# Patient Record
Sex: FEMALE | Race: ASIAN | NOT HISPANIC OR LATINO | Employment: UNEMPLOYED | URBAN - METROPOLITAN AREA
[De-identification: names, ages, dates, MRNs, and addresses within clinical notes are randomized per-mention and may not be internally consistent; named-entity substitution may affect disease eponyms.]

---

## 2019-07-29 ENCOUNTER — TRANSFERRED RECORDS (OUTPATIENT)
Dept: HEALTH INFORMATION MANAGEMENT | Facility: CLINIC | Age: 34
End: 2019-07-29

## 2019-07-29 LAB — TSH SERPL-ACNC: 2.56 MICIU/ML (ref 0.34–4.25)

## 2019-09-18 ENCOUNTER — TRANSFERRED RECORDS (OUTPATIENT)
Dept: HEALTH INFORMATION MANAGEMENT | Facility: CLINIC | Age: 34
End: 2019-09-18

## 2019-09-18 LAB — EJECTION FRACTION: 64 %

## 2019-09-25 ENCOUNTER — TRANSFERRED RECORDS (OUTPATIENT)
Dept: HEALTH INFORMATION MANAGEMENT | Facility: CLINIC | Age: 34
End: 2019-09-25

## 2019-11-01 ENCOUNTER — TRANSFERRED RECORDS (OUTPATIENT)
Dept: HEALTH INFORMATION MANAGEMENT | Facility: CLINIC | Age: 34
End: 2019-11-01

## 2019-11-01 LAB — PAP SMEAR - HIM PATIENT REPORTED: NEGATIVE

## 2020-08-05 NOTE — PROGRESS NOTES
"Subjective Pallavi Richa is a 34 year old female who presents to clinic today for the following health issues:    HPI       Hypertension Follow-up      Do you check your blood pressure regularly outside of the clinic? Yes     Are you following a low salt diet? No    Are your blood pressures ever more than 140 on the top number (systolic) OR more   than 90 on the bottom number (diastolic), for example 140/90? Yes      How many servings of fruits and vegetables do you eat daily?  2-3    On average, how many sweetened beverages do you drink each day (Examples: soda, juice, sweet tea, etc.  Do NOT count diet or artificially sweetened beverages)?   0    How many days per week do you exercise enough to make your heart beat faster? 7    How many minutes a day do you exercise enough to make your heart beat faster? 30 - 60  How many days per week do you miss taking your medication? 7    What makes it hard for you to take your medications?  Returned from [facility] about 6 months ago and has just recently run out of medication, about 15-20 days      6 weeks 3 days pregnant  Has been off medication for several weeks- stopped days after finding out she is pregnant.  Has records from [facility] including normal Renal doppler, normal Echo, labs indicating mild anemia from diagnosis 1 year ago. Has been treated with Telmisartan 40 mg, Amlodipine 5 mg.   Since stopping meds, systolic BPs are 120-130's and diastolic upper 80's-low 90's at home, but unsure if cuff properly calibrated. No headache, blurred vision, etc.       Reviewed and updated as needed this visit by Provider         Review of Systems   Constitutional, HEENT, cardiovascular, pulmonary, gi and gu systems are negative, except as otherwise noted.      Objective    /86 (BP Location: Right arm, Patient Position: Chair, Cuff Size: Adult Regular)   Pulse 102   Temp 97.5  F (36.4  C) (Oral)   Ht 1.594 m (5' 2.75\")   Wt 66.7 kg (147 lb)   SpO2 100%   BMI 26.25 kg/m  "   Body mass index is 26.25 kg/m .  Physical Exam   GENERAL: healthy, alert and no distress  PSYCH: mentation appears normal, affect normal/bright    Diagnostic Test Results:  Labs reviewed in Epic  Results for orders placed or performed in visit on 08/06/20   Basic metabolic panel     Status: Abnormal   Result Value Ref Range    Sodium 137 133 - 144 mmol/L    Potassium 3.6 3.4 - 5.3 mmol/L    Chloride 106 94 - 109 mmol/L    Carbon Dioxide 26 20 - 32 mmol/L    Anion Gap 5 3 - 14 mmol/L    Glucose 91 70 - 99 mg/dL    Urea Nitrogen 7 7 - 30 mg/dL    Creatinine 0.46 (L) 0.52 - 1.04 mg/dL    GFR Estimate >90 >60 mL/min/[1.73_m2]    GFR Estimate If Black >90 >60 mL/min/[1.73_m2]    Calcium 8.8 8.5 - 10.1 mg/dL   CBC with platelets and differential     Status: Abnormal   Result Value Ref Range    WBC 7.5 4.0 - 11.0 10e9/L    RBC Count 4.07 3.8 - 5.2 10e12/L    Hemoglobin 11.3 (L) 11.7 - 15.7 g/dL    Hematocrit 34.2 (L) 35.0 - 47.0 %    MCV 84 78 - 100 fl    MCH 27.8 26.5 - 33.0 pg    MCHC 33.0 31.5 - 36.5 g/dL    RDW 14.5 10.0 - 15.0 %    Platelet Count 375 150 - 450 10e9/L    % Neutrophils 65.2 %    % Lymphocytes 26.5 %    % Monocytes 7.7 %    % Eosinophils 0.3 %    % Basophils 0.3 %    Absolute Neutrophil 4.9 1.6 - 8.3 10e9/L    Absolute Lymphocytes 2.0 0.8 - 5.3 10e9/L    Absolute Monocytes 0.6 0.0 - 1.3 10e9/L    Absolute Eosinophils 0.0 0.0 - 0.7 10e9/L    Absolute Basophils 0.0 0.0 - 0.2 10e9/L    Diff Method Automated Method            Assessment & Plan     1. Essential hypertension  BP here today is normal and off meds for several weeks. Recommend continued surveillance of BP- will return in 2 weeks for BP check with the nurse and patient to bring her cuff for comparison. If diastolic persistently elevated, will start Labetalol due to pregnancy.   - Basic metabolic panel  - CBC with platelets and differential    2. Pregnancy, incidental  As above. Imperative to avoid ACE/ARB and patient informed of this.  Fortunately, she did stop her medication within days of learning she was pregnant.         See Patient Instructions    Return in about 2 weeks (around 8/20/2020) for Nurse only BP check.    MICKEY Burgos [facility]

## 2020-08-06 ENCOUNTER — OFFICE VISIT (OUTPATIENT)
Dept: FAMILY MEDICINE | Facility: CLINIC | Age: 35
End: 2020-08-06
Payer: COMMERCIAL

## 2020-08-06 VITALS
SYSTOLIC BLOOD PRESSURE: 124 MMHG | DIASTOLIC BLOOD PRESSURE: 86 MMHG | BODY MASS INDEX: 26.05 KG/M2 | TEMPERATURE: 97.5 F | WEIGHT: 147 LBS | OXYGEN SATURATION: 100 % | HEIGHT: 63 IN | HEART RATE: 102 BPM

## 2020-08-06 DIAGNOSIS — I10 ESSENTIAL HYPERTENSION: Primary | ICD-10-CM

## 2020-08-06 DIAGNOSIS — Z33.1 PREGNANCY, INCIDENTAL: ICD-10-CM

## 2020-08-06 LAB
BASOPHILS # BLD AUTO: 0 10E9/L (ref 0–0.2)
BASOPHILS NFR BLD AUTO: 0.3 %
DIFFERENTIAL METHOD BLD: ABNORMAL
EOSINOPHIL # BLD AUTO: 0 10E9/L (ref 0–0.7)
EOSINOPHIL NFR BLD AUTO: 0.3 %
ERYTHROCYTE [DISTWIDTH] IN BLOOD BY AUTOMATED COUNT: 14.5 % (ref 10–15)
HCT VFR BLD AUTO: 34.2 % (ref 35–47)
HGB BLD-MCNC: 11.3 G/DL (ref 11.7–15.7)
LYMPHOCYTES # BLD AUTO: 2 10E9/L (ref 0.8–5.3)
LYMPHOCYTES NFR BLD AUTO: 26.5 %
MCH RBC QN AUTO: 27.8 PG (ref 26.5–33)
MCHC RBC AUTO-ENTMCNC: 33 G/DL (ref 31.5–36.5)
MCV RBC AUTO: 84 FL (ref 78–100)
MONOCYTES # BLD AUTO: 0.6 10E9/L (ref 0–1.3)
MONOCYTES NFR BLD AUTO: 7.7 %
NEUTROPHILS # BLD AUTO: 4.9 10E9/L (ref 1.6–8.3)
NEUTROPHILS NFR BLD AUTO: 65.2 %
PLATELET # BLD AUTO: 375 10E9/L (ref 150–450)
RBC # BLD AUTO: 4.07 10E12/L (ref 3.8–5.2)
WBC # BLD AUTO: 7.5 10E9/L (ref 4–11)

## 2020-08-06 PROCEDURE — 36415 COLL VENOUS BLD VENIPUNCTURE: CPT | Performed by: NURSE PRACTITIONER

## 2020-08-06 PROCEDURE — 80048 BASIC METABOLIC PNL TOTAL CA: CPT | Performed by: NURSE PRACTITIONER

## 2020-08-06 PROCEDURE — 99203 OFFICE O/P NEW LOW 30 MIN: CPT | Performed by: NURSE PRACTITIONER

## 2020-08-06 PROCEDURE — 85025 COMPLETE CBC W/AUTO DIFF WBC: CPT | Performed by: NURSE PRACTITIONER

## 2020-08-06 RX ORDER — TELMISARTAN AND HYDROCHLORTHIAZIDE 40; 12.5 MG/1; MG/1
1 TABLET ORAL DAILY
Status: CANCELLED | OUTPATIENT
Start: 2020-08-06

## 2020-08-06 ASSESSMENT — MIFFLIN-ST. JEOR: SCORE: 1326.95

## 2020-08-06 NOTE — Clinical Note
Please abstract the following data from this visit with this patient into the appropriate field in Epic:    Tests that can be patient reported without a hard copy:    {Quality Abstract List (Optional):914437}    Other Tests found in the patient's chart through Chart Review/Care Everywhere:    Pap smear done by this group [address] on this date: November 2019, results was normal    Note to Abstraction: If this section is blank, no results were found via Chart Review/Care Everywhere.

## 2020-08-06 NOTE — LETTER
August 11, 2020      Pallavi Richa  [address] [facility] [address]          Dear ,    We are writing to inform you of your test results.  Your results look ok. Hemoglobin is borderline low, and the Prenatal with added iron should help.       Resulted Orders   Basic metabolic panel   Result Value Ref Range    Sodium 137 133 - 144 mmol/L    Potassium 3.6 3.4 - 5.3 mmol/L    Chloride 106 94 - 109 mmol/L    Carbon Dioxide 26 20 - 32 mmol/L    Anion Gap 5 3 - 14 mmol/L    Glucose 91 70 - 99 mg/dL    Urea Nitrogen 7 7 - 30 mg/dL    Creatinine 0.46 (L) 0.52 - 1.04 mg/dL    GFR Estimate >90 >60 mL/min/[1.73_m2]      Comment:      Non  GFR Calc  Starting 12/18/2018, serum creatinine based estimated GFR (eGFR) will be   calculated using the Chronic Kidney Disease Epidemiology Collaboration   (CKD-EPI) equation.      GFR Estimate If Black >90 >60 mL/min/[1.73_m2]      Comment:       GFR Calc  Starting 12/18/2018, serum creatinine based estimated GFR (eGFR) will be   calculated using the Chronic Kidney Disease Epidemiology Collaboration   (CKD-EPI) equation.      Calcium 8.8 8.5 - 10.1 mg/dL   CBC with platelets and differential   Result Value Ref Range    WBC 7.5 4.0 - 11.0 10e9/L    RBC Count 4.07 3.8 - 5.2 10e12/L    Hemoglobin 11.3 (L) 11.7 - 15.7 g/dL    Hematocrit 34.2 (L) 35.0 - 47.0 %    MCV 84 78 - 100 fl    MCH 27.8 26.5 - 33.0 pg    MCHC 33.0 31.5 - 36.5 g/dL    RDW 14.5 10.0 - 15.0 %    Platelet Count 375 150 - 450 10e9/L    % Neutrophils 65.2 %    % Lymphocytes 26.5 %    % Monocytes 7.7 %    % Eosinophils 0.3 %    % Basophils 0.3 %    Absolute Neutrophil 4.9 1.6 - 8.3 10e9/L    Absolute Lymphocytes 2.0 0.8 - 5.3 10e9/L    Absolute Monocytes 0.6 0.0 - 1.3 10e9/L    Absolute Eosinophils 0.0 0.0 - 0.7 10e9/L    Absolute Basophils 0.0 0.0 - 0.2 10e9/L    Diff Method Automated Method        If you have any questions or concerns, please call the clinic at the number listed  above.       Sincerely,        MICKEY Burgos CNP

## 2020-08-07 LAB
ANION GAP SERPL CALCULATED.3IONS-SCNC: 5 MMOL/L (ref 3–14)
BUN SERPL-MCNC: 7 MG/DL (ref 7–30)
CALCIUM SERPL-MCNC: 8.8 MG/DL (ref 8.5–10.1)
CHLORIDE SERPL-SCNC: 106 MMOL/L (ref 94–109)
CO2 SERPL-SCNC: 26 MMOL/L (ref 20–32)
CREAT SERPL-MCNC: 0.46 MG/DL (ref 0.52–1.04)
GFR SERPL CREATININE-BSD FRML MDRD: >90 ML/MIN/{1.73_M2}
GLUCOSE SERPL-MCNC: 91 MG/DL (ref 70–99)
POTASSIUM SERPL-SCNC: 3.6 MMOL/L (ref 3.4–5.3)
SODIUM SERPL-SCNC: 137 MMOL/L (ref 133–144)

## 2020-08-07 NOTE — RESULT ENCOUNTER NOTE
Please mail letter:    Your results look ok. Hemoglobin is borderline low, and the Prenatal with added iron should help.    Evelyn Diego, CNP

## 2020-08-20 ENCOUNTER — ALLIED HEALTH/NURSE VISIT (OUTPATIENT)
Dept: NURSING | Facility: CLINIC | Age: 35
End: 2020-08-20
Payer: COMMERCIAL

## 2020-08-20 VITALS — SYSTOLIC BLOOD PRESSURE: 112 MMHG | DIASTOLIC BLOOD PRESSURE: 70 MMHG

## 2020-08-20 DIAGNOSIS — Z01.30 BP CHECK: Primary | ICD-10-CM

## 2020-09-08 ENCOUNTER — TRANSFERRED RECORDS (OUTPATIENT)
Dept: MULTI SPECIALTY CLINIC | Facility: CLINIC | Age: 35
End: 2020-09-08

## 2020-09-08 LAB — PAP-ABSTRACT: NORMAL

## 2021-01-04 ENCOUNTER — HEALTH MAINTENANCE LETTER (OUTPATIENT)
Age: 36
End: 2021-01-04

## 2021-04-30 ENCOUNTER — IMMUNIZATION (OUTPATIENT)
Dept: NURSING | Facility: CLINIC | Age: 36
End: 2021-04-30
Payer: COMMERCIAL

## 2021-04-30 PROCEDURE — 0001A PR COVID VAC PFIZER DIL RECON 30 MCG/0.3 ML IM: CPT

## 2021-04-30 PROCEDURE — 91300 PR COVID VAC PFIZER DIL RECON 30 MCG/0.3 ML IM: CPT

## 2021-05-19 ENCOUNTER — MEDICAL CORRESPONDENCE (OUTPATIENT)
Dept: HEALTH INFORMATION MANAGEMENT | Facility: CLINIC | Age: 36
End: 2021-05-19

## 2021-05-21 ENCOUNTER — IMMUNIZATION (OUTPATIENT)
Dept: NURSING | Facility: CLINIC | Age: 36
End: 2021-05-21
Attending: FAMILY MEDICINE
Payer: COMMERCIAL

## 2021-05-21 PROCEDURE — 0002A PR COVID VAC PFIZER DIL RECON 30 MCG/0.3 ML IM: CPT

## 2021-05-21 PROCEDURE — 91300 PR COVID VAC PFIZER DIL RECON 30 MCG/0.3 ML IM: CPT

## 2021-10-11 ENCOUNTER — HEALTH MAINTENANCE LETTER (OUTPATIENT)
Age: 36
End: 2021-10-11

## 2022-01-30 ENCOUNTER — HEALTH MAINTENANCE LETTER (OUTPATIENT)
Age: 37
End: 2022-01-30

## 2022-08-12 ENCOUNTER — OFFICE VISIT (OUTPATIENT)
Dept: FAMILY MEDICINE | Facility: CLINIC | Age: 37
End: 2022-08-12
Payer: COMMERCIAL

## 2022-08-12 VITALS
RESPIRATION RATE: 16 BRPM | SYSTOLIC BLOOD PRESSURE: 164 MMHG | TEMPERATURE: 98.7 F | OXYGEN SATURATION: 98 % | HEART RATE: 95 BPM | DIASTOLIC BLOOD PRESSURE: 119 MMHG | BODY MASS INDEX: 26.05 KG/M2 | WEIGHT: 147 LBS | HEIGHT: 63 IN

## 2022-08-12 DIAGNOSIS — I10 BENIGN ESSENTIAL HYPERTENSION: Primary | ICD-10-CM

## 2022-08-12 DIAGNOSIS — M54.59 MECHANICAL LOW BACK PAIN: ICD-10-CM

## 2022-08-12 PROCEDURE — 99214 OFFICE O/P EST MOD 30 MIN: CPT | Performed by: PREVENTIVE MEDICINE

## 2022-08-12 RX ORDER — AMLODIPINE BESYLATE 5 MG/1
5 TABLET ORAL DAILY
Qty: 30 TABLET | Refills: 1 | Status: SHIPPED | OUTPATIENT
Start: 2022-08-12 | End: 2022-09-09 | Stop reason: DRUGHIGH

## 2022-08-12 ASSESSMENT — PAIN SCALES - GENERAL: PAINLEVEL: NO PAIN (0)

## 2022-08-12 NOTE — PATIENT INSTRUCTIONS
At [facility], we strive to deliver an exceptional experience to you, every time we see you. If you receive a survey, please complete it as we do value your feedback.  If you have MyChart, you can expect to receive results automatically within 24 hours of their completion.  Your provider will send a note interpreting your results as well.   If you do not have MyChart, you should receive your results in about a week by mail.    Your care team:                            Family Medicine Internal Medicine   MD Reginald Perea MD Shantel Branch-Fleming, MD Srinivasa Vaka, MD Katya Belousova, MICKEY Antonio CNP, MD ([facility]   Ben Reeves, MD Randi Olivera MD Amelia Massimini APRN CNP Kim Thein, APRN CNP Bethany Templen, MD             Clinic hours: Monday - Thursday 7 am-6 pm; Fridays 7 am-5 pm.   Urgent care: Monday - Friday 10 am- 8 pm; Saturday and Sunday 9 am-5 pm.    Clinic: [phone]       [facility]: Monday - Thursday 8 am - 7 pm; Friday 8 am - 6 pm  [facility]: [phone]

## 2022-08-12 NOTE — PROGRESS NOTES
"  Assessment & Plan     Benign essential hypertension  -new diagnosis  -labs normal 5/22  -started Amlodipine 5 mg daily  -patient will provide updated readings via My Chart, if not at goal then plan to increase Amlodipine to 10 mg daily  -150 minutes of moderate physical activity per week  -DASH diet information provided   - REVIEW OF HEALTH MAINTENANCE PROTOCOL ORDERS  - amLODIPine (NORVASC) 5 MG tablet  Dispense: 30 tablet; Refill: 1    Mechanical low back pain  -heat and stretching  -self care information provided  -Acetaminophen as needed  -if symptoms worsen then plan to proceed wit PT       Prescription drug management  25 minutes spent on the date of the encounter doing chart review, history and exam, documentation and further activities per the note       BMI:   Estimated body mass index is 26.38 kg/m  as calculated from the following:    Height as of this encounter: 1.59 m (5' 2.6\").    Weight as of this encounter: 66.7 kg (147 lb).   Weight management plan: Discussed healthy diet and exercise guidelines      Return in about 6 months (around 2/12/2023) for Follow up, using a video visit.    Altagracia Jaquez MD MPH    [facility]    Subjective Pallavi Richa is a 36 year old presenting for the following health issues:  Blood Pressure      History of Present Illness       Reason for visit:  Blood pressure  Symptom onset:  More than a month  Symptoms include:  Headache  Symptom intensity:  Moderate  Had these symptoms before:  Yes    She eats 2-3 servings of fruits and vegetables daily.She consumes 2 sweetened beverage(s) daily.She exercises with enough effort to increase her heart rate 9 or less minutes per day.  She exercises with enough effort to increase her heart rate 3 or less days per week.   She is taking medications regularly.     Was seen 6/14/22 outside [place] for a headache, blood pressure was high then too, 146/108   Blood pressure checked at home 150/100  Family " "history of high blood pressure      No tobacco  No snoring    Hypertension Follow-up      Do you check your blood pressure regularly outside of the clinic? Yes     Are you following a low salt diet? No    Are your blood pressures ever more than 140 on the top number (systolic) OR more   than 90 on the bottom number (diastolic), for example 140/90? Yes      BACK PAIN FOR 2 MONTHS:  -more in the morning  -may be better with stretching  -lower back  -laying down makes it worse  -no falls  The patient does not have any focal weakness or numbness, no urine or stool incontinence, no hematuria, no saddle anesthesia and no gait abnormalities.       Review of Systems   Constitutional, HEENT, cardiovascular, pulmonary, gi and gu systems are negative, except as otherwise noted.      Objective    BP (!) 164/119   Pulse 95   Temp 98.7  F (37.1  C) (Tympanic)   Resp 16   Ht 1.59 m (5' 2.6\")   Wt 66.7 kg (147 lb)   SpO2 98%   BMI 26.38 kg/m    Body mass index is 26.38 kg/m .  Physical Exam   GENERAL APPEARANCE: healthy, alert and no distress  EYES: Eyes grossly normal to inspection and conjunctivae and sclerae normal  RESP: lungs clear to auscultation - no rales, rhonchi or wheezes  CV: regular rates and rhythm, normal S1 S2, no S3 or S4 and no murmur, click or rub  ABDOMEN: soft, non-tender and no rebound or guarding   MS: extremities normal- no gross deformities noted and peripheral pulses normal  SKIN: no suspicious lesions or rashes  NEURO: Normal strength and tone, mentation intact and speech normal, gait normal. Able to heel and toe walk   PSYCH: mentation appears normal  Lumbar spine: No swelling, bruising, erythema atrophy or deformity.  No tenderness noted on palpation of spinous processes.  Range of motion of the lumbar spine is full in all directions without focal deficit.  Strength is full.  SLR negative bilaterally.  Distal motor and sensory exam is intact.  Reflexes are 2+ and symmetrical.  Distal pulses " intact. No sacroiliac tenderness bilaterally.  Great toe extension normal.       Labs done 5/22, reviewed through Care Everywhere.             .  ..

## 2022-09-05 ENCOUNTER — MYC MEDICAL ADVICE (OUTPATIENT)
Dept: FAMILY MEDICINE | Facility: CLINIC | Age: 37
End: 2022-09-05

## 2022-09-05 DIAGNOSIS — I10 BENIGN ESSENTIAL HYPERTENSION: Primary | ICD-10-CM

## 2022-09-06 ENCOUNTER — DOCUMENTATION ONLY (OUTPATIENT)
Dept: FAMILY MEDICINE | Facility: CLINIC | Age: 37
End: 2022-09-06

## 2022-09-06 VITALS — SYSTOLIC BLOOD PRESSURE: 118 MMHG | DIASTOLIC BLOOD PRESSURE: 84 MMHG

## 2022-09-06 NOTE — PROGRESS NOTES
Blood pressure readings updated in the chart per update by patient via My Chart.  Altagracia Jaquez MD MPH

## 2022-09-09 RX ORDER — AMLODIPINE BESYLATE 10 MG/1
10 TABLET ORAL DAILY
Qty: 90 TABLET | Refills: 1 | Status: SHIPPED | OUTPATIENT
Start: 2022-09-09 | End: 2023-04-26

## 2022-09-24 ENCOUNTER — HEALTH MAINTENANCE LETTER (OUTPATIENT)
Age: 37
End: 2022-09-24

## 2023-04-26 DIAGNOSIS — I10 BENIGN ESSENTIAL HYPERTENSION: ICD-10-CM

## 2023-04-26 RX ORDER — AMLODIPINE BESYLATE 10 MG/1
TABLET ORAL
Qty: 90 TABLET | Refills: 0 | Status: SHIPPED | OUTPATIENT
Start: 2023-04-26 | End: 2023-07-28

## 2023-06-29 ENCOUNTER — OFFICE VISIT (OUTPATIENT)
Dept: OBGYN | Facility: CLINIC | Age: 38
End: 2023-06-29
Payer: COMMERCIAL

## 2023-06-29 VITALS
BODY MASS INDEX: 25.69 KG/M2 | DIASTOLIC BLOOD PRESSURE: 84 MMHG | HEART RATE: 80 BPM | HEIGHT: 63 IN | WEIGHT: 145 LBS | SYSTOLIC BLOOD PRESSURE: 130 MMHG

## 2023-06-29 DIAGNOSIS — N92.0 MENORRHAGIA WITH REGULAR CYCLE: Primary | ICD-10-CM

## 2023-06-29 PROCEDURE — 99203 OFFICE O/P NEW LOW 30 MIN: CPT | Performed by: OBSTETRICS & GYNECOLOGY

## 2023-06-29 NOTE — PROGRESS NOTES
CC: Pallavi Richa is here secondary to abnormal bleeding.    HPI: The pt is a 37 year old MAF  who presents with bleeding separate from her period.  She had a period from -.  It was at the normal timing but was lighter than usual and brown in color.  Then on the  she had some spotting.  On the  she started having heavy bleeding that lasted until the .  Yesterday and today it has only been spotting.  She started having cramps a little bit before the heavy bleeding started.  Normally her periods are once a month lasting 6 days.  The first 2-3 days are heavier with cramping.  In 2019 prior to her most recent pregnancy, she was diagnosed with an endometrial polyp that was removed by D&C in [place].  She had similar bleeding at that time.    No past medical history on file.    Past Surgical History:   Procedure Laterality Date     SECTION      GYN SURGERY      cervical polypectomy with anesthesia       Patient's   Family History   Problem Relation Age of Onset    Hypertension Father     Heart Disease Father     Breast Cancer Maternal Aunt     Diabetes No family hx of        Patient   Social History     Socioeconomic History    Marital status:      Spouse name: None    Number of children: None    Years of education: None    Highest education level: None   Tobacco Use    Smoking status: Never    Smokeless tobacco: Never   Substance and Sexual Activity    Alcohol use: Not Currently    Drug use: Not Currently       Outpatient Medications Prior to Visit   Medication Sig Dispense Refill    amLODIPine (NORVASC) 10 MG tablet TAKE 1 TABLET BY MOUTH DAILY FOR BLOOD PRESSURE 90 tablet 0    UNABLE TO FIND MEDICATION NAME: Tazloc-AM Tablet, 40mg-5mg one tablet after breakfast daily for systemic hypertension (Patient not taking: Reported on 2023)      vitamin D3 (CHOLECALCIFEROL) 125 MCG (5000 UT) tablet Take 5,000 Units by mouth (Patient not taking: Reported on 2023)       No  "facility-administered medications prior to visit.       Patient has No Known Allergies.    ROS:  12 part ROS is negative aside from those symptoms in the HPI    PE:  /84 (BP Location: Right arm, Patient Position: Sitting, Cuff Size: Adult Regular)   Pulse 80   Ht 1.594 m (5' 2.75\")   Wt 65.8 kg (145 lb)   LMP 06/05/2023 (Exact Date)           Body mass index is 25.89 kg/m .    General: overweight F, NAD  Psych: normal mood  Neuro: CN I-XII grossly intact  MS: normal gait    Assessment: 37 year old MEIF  with period changes.    Plan: Natural history of potential causes for her period changes were discussed with the patient.  We discussed she could have a new polyp that has grown.  Pelvic ultrasound was ordered for further evaluation.  We also discussed that she could be having some hormonal changes as well.  Follow up will be determined once the ultrasound results are back.  Questions were answered to the best of my ability.    21 minutes spent on the date of the encounter doing chart review, patient visit, and documentation   "

## 2023-06-30 ENCOUNTER — ANCILLARY PROCEDURE (OUTPATIENT)
Dept: ULTRASOUND IMAGING | Facility: CLINIC | Age: 38
End: 2023-06-30
Attending: OBSTETRICS & GYNECOLOGY
Payer: COMMERCIAL

## 2023-06-30 DIAGNOSIS — N92.0 MENORRHAGIA WITH REGULAR CYCLE: ICD-10-CM

## 2023-06-30 PROCEDURE — 76856 US EXAM PELVIC COMPLETE: CPT | Mod: TC | Performed by: RADIOLOGY

## 2023-06-30 PROCEDURE — 76830 TRANSVAGINAL US NON-OB: CPT | Mod: TC | Performed by: RADIOLOGY

## 2023-07-27 DIAGNOSIS — I10 BENIGN ESSENTIAL HYPERTENSION: ICD-10-CM

## 2023-07-28 RX ORDER — AMLODIPINE BESYLATE 10 MG/1
TABLET ORAL
Qty: 90 TABLET | Refills: 0 | Status: SHIPPED | OUTPATIENT
Start: 2023-07-28 | End: 2024-01-29

## 2023-07-28 NOTE — TELEPHONE ENCOUNTER
Called pt and relayed message from provider. Pt understands. She will call back to schedule an appt.

## 2023-10-27 DIAGNOSIS — I10 BENIGN ESSENTIAL HYPERTENSION: ICD-10-CM

## 2023-10-27 RX ORDER — AMLODIPINE BESYLATE 10 MG/1
TABLET ORAL
Qty: 90 TABLET | Refills: 0 | OUTPATIENT
Start: 2023-10-27

## 2024-01-25 DIAGNOSIS — I10 BENIGN ESSENTIAL HYPERTENSION: ICD-10-CM

## 2024-01-29 RX ORDER — AMLODIPINE BESYLATE 10 MG/1
10 TABLET ORAL DAILY
Qty: 90 TABLET | Refills: 0 | Status: SHIPPED | OUTPATIENT
Start: 2024-01-29 | End: 2024-05-30

## 2024-01-29 RX ORDER — AMLODIPINE BESYLATE 10 MG/1
TABLET ORAL
Qty: 90 TABLET | Refills: 0 | OUTPATIENT
Start: 2024-01-29

## 2024-02-05 ENCOUNTER — TELEPHONE (OUTPATIENT)
Dept: FAMILY MEDICINE | Facility: CLINIC | Age: 39
End: 2024-02-05
Payer: COMMERCIAL

## 2024-02-05 NOTE — TELEPHONE ENCOUNTER
Pt following up on amlodipine refill request. Per chart review, prescription sent and received by pharmacy 1/29. Pt states she spoke with pharmacy staff who do not have active prescription.      RN spoke with pharmacy staff, confirmed no active prescription on file. RN spoke with pharmacist and provided verbal order for prescription.     Lissette Christianson RN

## 2024-02-27 ENCOUNTER — MYC MEDICAL ADVICE (OUTPATIENT)
Dept: OBGYN | Facility: CLINIC | Age: 39
End: 2024-02-27
Payer: COMMERCIAL

## 2024-02-27 DIAGNOSIS — N92.0 MENORRHAGIA WITH REGULAR CYCLE: Primary | ICD-10-CM

## 2024-02-29 RX ORDER — MEDROXYPROGESTERONE ACETATE 10 MG
10 TABLET ORAL DAILY
Qty: 10 TABLET | Refills: 1 | Status: SHIPPED | OUTPATIENT
Start: 2024-02-29 | End: 2024-05-30

## 2024-05-30 ENCOUNTER — MYC MEDICAL ADVICE (OUTPATIENT)
Dept: FAMILY MEDICINE | Facility: CLINIC | Age: 39
End: 2024-05-30

## 2024-05-30 ENCOUNTER — OFFICE VISIT (OUTPATIENT)
Dept: FAMILY MEDICINE | Facility: CLINIC | Age: 39
End: 2024-05-30
Payer: COMMERCIAL

## 2024-05-30 VITALS
BODY MASS INDEX: 25.55 KG/M2 | WEIGHT: 144.2 LBS | DIASTOLIC BLOOD PRESSURE: 87 MMHG | RESPIRATION RATE: 16 BRPM | TEMPERATURE: 98.5 F | HEIGHT: 63 IN | HEART RATE: 65 BPM | SYSTOLIC BLOOD PRESSURE: 128 MMHG | OXYGEN SATURATION: 100 %

## 2024-05-30 DIAGNOSIS — I10 BENIGN ESSENTIAL HYPERTENSION: ICD-10-CM

## 2024-05-30 DIAGNOSIS — Z13.220 LIPID SCREENING: ICD-10-CM

## 2024-05-30 DIAGNOSIS — I10 BENIGN ESSENTIAL HYPERTENSION: Primary | ICD-10-CM

## 2024-05-30 DIAGNOSIS — Z00.00 ROUTINE GENERAL MEDICAL EXAMINATION AT A HEALTH CARE FACILITY: Primary | ICD-10-CM

## 2024-05-30 DIAGNOSIS — Z13.1 ENCOUNTER FOR SCREENING EXAMINATION FOR IMPAIRED GLUCOSE REGULATION AND DIABETES MELLITUS: ICD-10-CM

## 2024-05-30 DIAGNOSIS — R73.03 PRE-DIABETES: ICD-10-CM

## 2024-05-30 DIAGNOSIS — E55.9 VITAMIN D DEFICIENCY: ICD-10-CM

## 2024-05-30 DIAGNOSIS — Z12.31 VISIT FOR SCREENING MAMMOGRAM: ICD-10-CM

## 2024-05-30 LAB
ERYTHROCYTE [DISTWIDTH] IN BLOOD BY AUTOMATED COUNT: 16.3 % (ref 10–15)
HBA1C MFR BLD: 5.7 % (ref 0–5.6)
HCT VFR BLD AUTO: 36.5 % (ref 35–47)
HGB BLD-MCNC: 11.5 G/DL (ref 11.7–15.7)
MCH RBC QN AUTO: 25 PG (ref 26.5–33)
MCHC RBC AUTO-ENTMCNC: 31.5 G/DL (ref 31.5–36.5)
MCV RBC AUTO: 79 FL (ref 78–100)
PLATELET # BLD AUTO: 431 10E3/UL (ref 150–450)
RBC # BLD AUTO: 4.6 10E6/UL (ref 3.8–5.2)
WBC # BLD AUTO: 9.1 10E3/UL (ref 4–11)

## 2024-05-30 PROCEDURE — 80053 COMPREHEN METABOLIC PANEL: CPT | Performed by: PREVENTIVE MEDICINE

## 2024-05-30 PROCEDURE — 99214 OFFICE O/P EST MOD 30 MIN: CPT | Mod: 25 | Performed by: PREVENTIVE MEDICINE

## 2024-05-30 PROCEDURE — 36415 COLL VENOUS BLD VENIPUNCTURE: CPT | Performed by: PREVENTIVE MEDICINE

## 2024-05-30 PROCEDURE — 80061 LIPID PANEL: CPT | Performed by: PREVENTIVE MEDICINE

## 2024-05-30 PROCEDURE — 83036 HEMOGLOBIN GLYCOSYLATED A1C: CPT | Performed by: PREVENTIVE MEDICINE

## 2024-05-30 PROCEDURE — 82043 UR ALBUMIN QUANTITATIVE: CPT | Performed by: PREVENTIVE MEDICINE

## 2024-05-30 PROCEDURE — 82306 VITAMIN D 25 HYDROXY: CPT | Performed by: PREVENTIVE MEDICINE

## 2024-05-30 PROCEDURE — 99395 PREV VISIT EST AGE 18-39: CPT | Performed by: PREVENTIVE MEDICINE

## 2024-05-30 PROCEDURE — 82570 ASSAY OF URINE CREATININE: CPT | Performed by: PREVENTIVE MEDICINE

## 2024-05-30 PROCEDURE — 85027 COMPLETE CBC AUTOMATED: CPT | Performed by: PREVENTIVE MEDICINE

## 2024-05-30 RX ORDER — AMLODIPINE BESYLATE 10 MG/1
10 TABLET ORAL DAILY
Qty: 90 TABLET | Refills: 3 | Status: SHIPPED | OUTPATIENT
Start: 2024-05-30

## 2024-05-30 SDOH — HEALTH STABILITY: PHYSICAL HEALTH: ON AVERAGE, HOW MANY DAYS PER WEEK DO YOU ENGAGE IN MODERATE TO STRENUOUS EXERCISE (LIKE A BRISK WALK)?: 3 DAYS

## 2024-05-30 ASSESSMENT — SOCIAL DETERMINANTS OF HEALTH (SDOH): HOW OFTEN DO YOU GET TOGETHER WITH FRIENDS OR RELATIVES?: PATIENT DECLINED

## 2024-05-30 NOTE — PROGRESS NOTES
"Preventive Care Visit  [facility]  Altagracia Jaquez MD, Family Medicine  May 30, 2024      Assessment & Plan     Routine general medical examination at a health care facility  - Lipid panel reflex to direct LDL Non-fasting  - Adult Eye  Referral, diagnosed with glaucoma in [place] this year     Benign essential hypertension  -at goal  -continue current medication   -refills provided   - Albumin Random Urine Quantitative with Creat Ratio  - Hemoglobin A1c  - CBC with platelets  - Comprehensive metabolic panel (BMP + Alb, Alk Phos, ALT, AST, Total. Bili, TP)  - amLODIPine (NORVASC) 10 MG tablet  Dispense: 90 tablet; Refill: 3    Lipid screening  - Lipid panel reflex to direct LDL Non-fasting    Vitamin D deficiency  -has had low levels in the past   - Vitamin D Deficiency    Encounter for screening examination for impaired glucose regulation and diabetes mellitus  -gestational diabetes history   - Hemoglobin A1c    Visit for screening mammogram  -will schedule when she turns 39 later this year  -maternal aunt with breast cancer age 52 years   - MA Screen Bilateral w/Noel          BMI  Estimated body mass index is 25.95 kg/m  as calculated from the following:    Height as of this encounter: 1.588 m (5' 2.5\").    Weight as of this encounter: 65.4 kg (144 lb 3.2 oz).   Weight management plan: Discussed healthy diet and exercise guidelines    Counseling  Appropriate preventive services were discussed with this patient, including applicable screening as appropriate for fall prevention, nutrition, physical activity, Tobacco-use cessation, weight loss and cognition.  Checklist reviewing preventive services available has been given to the patient.  Reviewed patient's diet, addressing concerns and/or questions.   She is at risk for lack of exercise and has been provided with information to increase physical activity for the benefit of her well-being.       Regular exercise    Subjective "   Pallavi Richa is a 38 year old, presenting for the following:  Physical        5/30/2024     2:00 PM   Additional Questions   Roomed by yani        Health Care Directive  Patient does not have a Health Care Directive or Living Will: Discussed advance care planning with patient; information given to patient to review.    HPI      Pap smear 2020, repeat in 5 years     Wt Readings from Last 2 Encounters:   05/30/24 65.4 kg (144 lb 3.2 oz)   06/29/23 65.8 kg (145 lb)        Hypertension Follow-up    Do you check your blood pressure regularly outside of the clinic? Yes   Are you following a low salt diet? Yes  Are your blood pressures ever more than 140 on the top number (systolic) OR more   than 90 on the bottom number (diastolic), for example 140/90? No    May forget medication   Exercise+     Diagnosed with glaucoma in [place] this year.  Was having some irritation and was started on Dorzolamide drops and was told needs long term.          5/30/2024   General Health   How would you rate your overall physical health? Good   Feel stress (tense, anxious, or unable to sleep) Patient declined         5/30/2024   Nutrition   Three or more servings of calcium each day? Yes   Diet: Regular (no restrictions)   How many servings of fruit and vegetables per day? 4 or more   How many sweetened beverages each day? (!) 2         5/30/2024   Exercise   Days per week of moderate/strenous exercise 3 days         5/30/2024   Social Factors   Frequency of gathering with friends or relatives Patient declined   Worry food won't last until get money to buy more No   Food not last or not have enough money for food? No   Do you have housing?  Yes   Are you worried about losing your housing? No   Lack of transportation? No   Unable to get utilities (heat,electricity)? No         5/30/2024   Dental   Dentist two times every year? Yes         5/30/2024   TB Screening   Were you born outside of the [place]? Yes         Today's PHQ-2 Score:        2024     2:11 PM   PHQ-2 (  [employer])   Q1: Little interest or pleasure in doing things 0   Q2: Feeling down, depressed or hopeless 0   PHQ-2 Score 0   Q1: Little interest or pleasure in doing things Not at all   Q2: Feeling down, depressed or hopeless Not at all   PHQ-2 Score 0           2024   Substance Use   Alcohol more than 3/day or more than 7/wk Not Applicable   Do you use any other substances recreationally? No     Social History     Tobacco Use    Smoking status: Never    Smokeless tobacco: Never   Substance Use Topics    Alcohol use: Not Currently    Drug use: Not Currently          Mammogram Screening - Patient under 40 years of age: Routine Mammogram Screening not recommended.         2024   STI Screening   New sexual partner(s) since last STI/HIV test? No     History of abnormal Pap smear: No - age 30- 64 PAP with HPV every 5 years recommended        2020     3:50 PM   PAP / HPV   PAP-ABSTRACT See Scanned Document           This result is from an external source.           2024   Contraception/Family Planning   Questions about contraception or family planning No        Reviewed and updated as needed this visit by Provider   Tobacco  Allergies  Meds  Problems  Med Hx  Surg Hx  Fam Hx            History reviewed. No pertinent past medical history.  Past Surgical History:   Procedure Laterality Date     SECTION      GYN SURGERY      cervical polypectomy with anesthesia     Lab work is in process  Labs reviewed in EPIC  BP Readings from Last 3 Encounters:   24 128/87   23 130/84   22 118/84    Wt Readings from Last 3 Encounters:   24 65.4 kg (144 lb 3.2 oz)   23 65.8 kg (145 lb)   22 66.7 kg (147 lb)                  There is no problem list on file for this patient.    Past Surgical History:   Procedure Laterality Date     SECTION      GYN SURGERY      cervical polypectomy with anesthesia       Social History     Tobacco  "Use    Smoking status: Never    Smokeless tobacco: Never   Substance Use Topics    Alcohol use: Not Currently     Family History   Problem Relation Age of Onset    Hypertension Father     Heart Disease Father     Breast Cancer Maternal Aunt     Diabetes No family hx of          Current Outpatient Medications   Medication Sig Dispense Refill    amLODIPine (NORVASC) 10 MG tablet Take 1 tablet (10 mg) by mouth daily for blood pressure 90 tablet 3    vitamin D3 (CHOLECALCIFEROL) 125 MCG (5000 UT) tablet Take 5,000 Units by mouth      UNABLE TO FIND MEDICATION NAME: Tazloc-AM Tablet, 40mg-5mg one tablet after breakfast daily for systemic hypertension (Patient not taking: Reported on 6/29/2023)       No Known Allergies      Review of Systems  Constitutional, HEENT, cardiovascular, pulmonary, gi and gu systems are negative, except as otherwise noted.     Objective    Exam  /87   Pulse 65   Temp 98.5  F (36.9  C) (Oral)   Resp 16   Ht 1.588 m (5' 2.5\")   Wt 65.4 kg (144 lb 3.2 oz)   LMP 05/10/2024   SpO2 100%   BMI 25.95 kg/m     Estimated body mass index is 25.95 kg/m  as calculated from the following:    Height as of this encounter: 1.588 m (5' 2.5\").    Weight as of this encounter: 65.4 kg (144 lb 3.2 oz).    Physical Exam  GENERAL APPEARANCE: healthy, alert and no distress  EYES: Eyes grossly normal to inspection and conjunctivae and sclerae normal  HENT: Intact TMs  NECK: no adenopathy and trachea midline and normal to palpation  RESP: lungs clear to auscultation - no rales, rhonchi or wheezes  CV: regular rates and rhythm, normal S1 S2, no S3 or S4 and no murmur, click or rub  ABDOMEN: soft, non-tender and no rebound or guarding   MS: extremities normal- no gross deformities noted and peripheral pulses normal  SKIN: no suspicious lesions or rashes  NEURO: Normal strength and tone, mentation intact and speech normal  PSYCH: mentation appears normal      Signed Electronically by: Altagracia Jaquez MD " MPH

## 2024-05-30 NOTE — RESULT ENCOUNTER NOTE
Pallavi,     Three month glucose number Hemoglobin A1C is indicative of pre diabetes.  Prediabetes means your blood sugar is high and you are at increased risk for developing overt diabetes in the future.   Be sure to monitor your intake of things like bread, pasta, rice, starchy foods (ie: potatoes), sugary beverages (ie: soda, juice-even the natural kind) and alcohol.  I recommend your plate be 1/2 vegetables, 1/4 protein, and 1/4 carbohydrate.      Complete blood count is showing stable hemoglobin levels.   Other labs are pending.       Please do not hesitate to call us at [phone] if you have any questions or concerns.    Thank you,    Altagracia Jaquez MD MPH

## 2024-05-30 NOTE — PATIENT INSTRUCTIONS
"Referral Details    Referred By  Referred To   Altagracia Jaquez MD   [id number]   [place] [address]   Phone: [phone]   Fax: [phone]    Diagnoses: Routine general medical examination at a health care facility   Order: Adult Eye  Referral       Comment: Please be aware that coverage of these services is subject to the terms and limitations of your health insurance plan.  Call member services at your health plan with any benefit or coverage questions.   [facility] will call you to coordinate your care as prescribed by your provider. If you don't hear from a representative within 2 business days, please call [phone].         To schedule mammogram you can use My Chart or call [phone]     Preventive Care Advice   This is general advice we often give to help people stay healthy. Your care team may have specific advice just for you. Please talk to your care team about your own preventive care needs.  Lifestyle  Exercise at least 150 minutes each week (30 minutes a day, 5 days a week).  Do muscle strengthening activities 2 days a week. These help control your weight and prevent disease.  No smoking.  Wear sunscreen to prevent skin cancer.  Have your home tested for radon every 2 to 5 years. Radon is a colorless, odorless gas that can harm your lungs. To learn more, go to www.health.[facility].[place]. and search for \"Radon in Homes.\"  Keep guns unloaded and locked up in a safe place like a safe or gun vault, or, use a gun lock and hide the keys. Always lock away bullets separately. To learn more, visit [place].gov and search for \"safe gun storage.\"  Nutrition  Eat 5 or more servings of fruits and vegetables each day.  Try wheat bread, brown rice and whole grain pasta (instead of white bread, rice, and pasta).  Get enough calcium and vitamin D. Check the label on foods and aim for 100% of the RDA (recommended daily allowance).  Regular exams  Have a dental exam and cleaning every 6 " months.  See your health care team every year to talk about:  Any changes in your health.  Any medicines your care team has prescribed.  Preventive care, family planning, and ways to prevent chronic diseases.  Shots (vaccines)   HPV shots (up to age 26), if you've never had them before.  Hepatitis B shots (up to age 59), if you've never had them before.  COVID-19 shot: Get this shot when it's due.  Flu shot: Get a flu shot every year.  Tetanus shot: Get a tetanus shot every 10 years.  Pneumococcal, hepatitis A, and RSV shots: Ask your care team if you need these based on your risk.  Shingles shot (for age 50 and up).  General health tests  Diabetes screening:  Starting at age 35, Get screened for diabetes at least every 3 years.  If you are younger than age 35, ask your care team if you should be screened for diabetes.  Cholesterol test: At age 39, start having a cholesterol test every 5 years, or more often if advised.  Bone density scan (DEXA): At age 50, ask your care team if you should have this scan for osteoporosis (brittle bones).  Hepatitis C: Get tested at least once in your life.  Abdominal aortic aneurysm screening: Talk to your doctor about having this screening if you:  Have ever smoked; and  Are biologically male; and  Are between the ages of 65 and 75.  STIs (sexually transmitted infections)  Before age 24: Ask your care team if you should be screened for STIs.  After age 24: Get screened for STIs if you're at risk. You are at risk for STIs (including HIV) if:  You are sexually active with more than one person.  You don't use condoms every time.  You or a partner was diagnosed with a sexually transmitted infection.  If you are at risk for HIV, ask about PrEP medicine to prevent HIV.  Get tested for HIV at least once in your life, whether you are at risk for HIV or not.  Cancer screening tests  Cervical cancer screening: If you have a cervix, begin getting regular cervical cancer screening tests at age  21. Most people who have regular screenings with normal results can stop after age 65. Talk about this with your provider.  Breast cancer scan (mammogram): If you've ever had breasts, begin having regular mammograms starting at age 40. This is a scan to check for breast cancer.  Colon cancer screening: It is important to start screening for colon cancer at age 45.  Have a colonoscopy test every 10 years (or more often if you're at risk) Or, ask your provider about stool tests like a FIT test every year or Cologuard test every 3 years.  To learn more about your testing options, visit: www.[employer]/830309.pdf.  For help making a decision, visit: cheryl.gee/uf99068.  Prostate cancer screening test: If you have a prostate and are age 55 to 69, ask your provider if you would benefit from a yearly prostate cancer screening test.  Lung cancer screening: If you are a current or former smoker age 50 to 80, ask your care team if ongoing lung cancer screenings are right for you.  For informational purposes only. Not to replace the advice of your health care provider. Copyright   2023 [employer]. All rights reserved. Clinically reviewed by the [facility] Transitions Program. [employer] 178631 - REV 04/24.

## 2024-05-31 ENCOUNTER — MYC MEDICAL ADVICE (OUTPATIENT)
Dept: FAMILY MEDICINE | Facility: CLINIC | Age: 39
End: 2024-05-31
Payer: COMMERCIAL

## 2024-05-31 LAB
ALBUMIN SERPL BCG-MCNC: 4.7 G/DL (ref 3.5–5.2)
ALP SERPL-CCNC: 92 U/L (ref 40–150)
ALT SERPL W P-5'-P-CCNC: 17 U/L (ref 0–50)
ANION GAP SERPL CALCULATED.3IONS-SCNC: 12 MMOL/L (ref 7–15)
AST SERPL W P-5'-P-CCNC: 20 U/L (ref 0–45)
BILIRUB SERPL-MCNC: 0.3 MG/DL
BUN SERPL-MCNC: 10.5 MG/DL (ref 6–20)
CALCIUM SERPL-MCNC: 9.5 MG/DL (ref 8.6–10)
CHLORIDE SERPL-SCNC: 103 MMOL/L (ref 98–107)
CHOLEST SERPL-MCNC: 165 MG/DL
CREAT SERPL-MCNC: 0.55 MG/DL (ref 0.51–0.95)
CREAT UR-MCNC: 32.8 MG/DL
DEPRECATED HCO3 PLAS-SCNC: 23 MMOL/L (ref 22–29)
EGFRCR SERPLBLD CKD-EPI 2021: >90 ML/MIN/1.73M2
FASTING STATUS PATIENT QL REPORTED: NO
FASTING STATUS PATIENT QL REPORTED: NO
GLUCOSE SERPL-MCNC: 79 MG/DL (ref 70–99)
HDLC SERPL-MCNC: 69 MG/DL
LDLC SERPL CALC-MCNC: 74 MG/DL
MICROALBUMIN UR-MCNC: 20.1 MG/L
MICROALBUMIN/CREAT UR: 61.28 MG/G CR (ref 0–25)
NONHDLC SERPL-MCNC: 96 MG/DL
POTASSIUM SERPL-SCNC: 3.7 MMOL/L (ref 3.4–5.3)
PROT SERPL-MCNC: 8 G/DL (ref 6.4–8.3)
SODIUM SERPL-SCNC: 138 MMOL/L (ref 135–145)
TRIGL SERPL-MCNC: 112 MG/DL
VIT D+METAB SERPL-MCNC: 23 NG/ML (ref 20–50)

## 2024-05-31 RX ORDER — LANCETS
EACH MISCELLANEOUS
Qty: 100 EACH | Refills: 6 | Status: SHIPPED | OUTPATIENT
Start: 2024-05-31

## 2024-05-31 RX ORDER — ADHESIVE BANDAGE 3/4"
BANDAGE TOPICAL
Qty: 1 EACH | Refills: 0 | Status: SHIPPED | OUTPATIENT
Start: 2024-05-31

## 2024-05-31 NOTE — TELEPHONE ENCOUNTER
Script for a cuff was already sent today at 1058 AM.  Was there a problem with that prescription going through to the pharmacy?  Will hold off on sending in a Duplicate request.     Thank you,  Altagracia Jaquez MD MPH

## 2024-05-31 NOTE — TELEPHONE ENCOUNTER
I will send in scripts for both Blood pressure monitor and glucometer.   Patient will need to confirm coverage with her insurance plan.  She does not need to be monitoring her glucose at home since she is NOT diabetic. She has pre diabetes, and the lifestyle changes are the main thing. We can recheck labs in 6 months.   If she still decides to check her glucose then fasting readings should be less than 130 mg/dl.    Thank you,  Altagracia Jaquez MD MPH

## 2024-05-31 NOTE — TELEPHONE ENCOUNTER
Per chart review, prescription for blood pressure cuff was received by pharmacy today at 10:58 am. RN sent pt [employer] message, advised pt to call pharmacy to see when she can  prescription.       Lissette Christianson RN

## 2024-05-31 NOTE — TELEPHONE ENCOUNTER
I don't think this would be covered for pre diabetes by health insurance.  She can buy this without a script over the counter.  For example a glucometer at [facility] is $15.   Thanks.  Altagracia Jaquez MD MPH

## 2024-06-03 NOTE — RESULT ENCOUNTER NOTE
Dear Pallavi Richa    Here are your cholesterol results:    Your LDL is: Lab Results       Component                Value               Date                       LDL                      74                  05/30/2024          Your LDL goal is to be less than 130  Your HDL is: Lab Results       Component                Value               Date                       HDL                      69                  05/30/2024           Goal HDL is Greater than 40 (for men) or 50 (for women).  Your Triglycerides are: Lab Results       Component                Value               Date                       TRIG                     112                 05/30/2024          Goal TRIGLYCERIDES are less than 150.     Vitamin D levels are normal.  Electrolytes, glucose, kidney function and liver function tests are normal.  Urine sample is showing a small amount of protein, this can happen in high blood pressure. Good blood pressure control will improve this. We will recheck at next labs. Also, avoid over the counter medications such as Advil, Aleve and Ibuprofen.    Please do not hesitate to call us at [phone] if you have any questions or concerns.    Thank you,    Altagracia Jaquez MD MPH

## 2024-12-01 ENCOUNTER — HEALTH MAINTENANCE LETTER (OUTPATIENT)
Age: 39
End: 2024-12-01

## 2024-12-26 ENCOUNTER — PATIENT OUTREACH (OUTPATIENT)
Dept: CARE COORDINATION | Facility: CLINIC | Age: 39
End: 2024-12-26
Payer: COMMERCIAL

## 2025-01-15 ENCOUNTER — TELEPHONE (OUTPATIENT)
Dept: FAMILY MEDICINE | Facility: CLINIC | Age: 40
End: 2025-01-15
Payer: COMMERCIAL

## 2025-01-15 NOTE — TELEPHONE ENCOUNTER
Patient Quality Outreach    Patient is due for the following:   Tdap  at [facility]     Action(s) Taken:   Entered in to chart     Type of outreach:    Chart review performed, no outreach needed.    Questions for provider review:    None           Sherley Valdez MA

## 2025-07-19 ENCOUNTER — HEALTH MAINTENANCE LETTER (OUTPATIENT)
Age: 40
End: 2025-07-19